# Patient Record
(demographics unavailable — no encounter records)

---

## 2024-10-24 NOTE — HISTORY OF PRESENT ILLNESS
[FreeTextEntry1] : Patient is 52 yo RHD female worked as a  at [employer]. Pt works for [employer], based in [place]. Most recent visit 12/22/2016.   Past hand history 1.  Excision left wrist dorsal ganglion 12/12/2016. 2.  Intermittent complaints of numbness and tingling.  TODAY: Because patient not seen for 7.5 years, patient classified NEW PATIENT for hand evaluation. The patient reports right wrist pain for 5 months.  Problem #1: Right wrist burning pain.  Radiating proximally and distally; arising from the right wrist first extensor compartment. Patient uses a computer and mouse at work.  Problem #2: Left hand: Patient perceives numbness and tingling in the tips of index and long fingers which is more or less continuous. Symptoms began approximately 5 months ago. Sometimes patient has altered sensation in fingernails. Symptoms can be worse upon awakening or when driving.   Patient denies head or neck complaints or history of cervical radiculopathy.  Patient not aware of any numbness or tingling in other fingers in the left hand and no numbness or tingling in right hand. Patient denies triggering. Patient has no other notable hand or wrist complaints.

## 2024-10-24 NOTE — PHYSICAL EXAM
[de-identified] : Right wrist Swelling first compartment with marked tenderness. Finkelstein test negative. Pain exacerbated by thumb abduction extension with wrist in neutral and with wrist in flexion. No other notable wrist findings. Basal joint manipulation minimal crepitus no pain Right hand No A1 pulley tenderness and no triggering in any finger. Normal joints  Left wrist Good motion. First compartment nontender negative Finkelstein, no swelling Left hand Mild atrophy median innervated thenar muscles No pertinent CMC, MP, PIP, or DIP joint contributory finding. No A1 pulley tenderness and no triggering in any finger.  Neurologic Left hand: Altered sensation in the left index and long fingers at rest. Normal sensation other digits Phalen's test with median nerve compression: Negative  Right hand Normal sensation all fingers Phalen's test with median nerve compression: Negative Radial nerve motor and sensory and ulnar nerve motor and sensory are intact.  Right wrist Swelling over first extensor compartment right wrist. Marked tenderness at first compartment and within 1 cm of first compartment. Although the patient describes pain radiating proximally and distally, there is no tenderness greater than 1 cm away from first compartment. 1-2 compartment intersection: No tenderness or pain.  No crepitus. Wrist motion is full without localizing findings of carpal bones or ligamentous laxity. Right basal joint manipulation: Subtle click with dorsal volar stress, otherwise no pain or tenderness. No crepitus other than the subtle click. No other notable wrist findings.  Right hand:  no A1 pulley tenderness and no triggering in any finger. No pertinent MP, PIP, or DIP joint contributory findings.  Left  wrist Good motion without localizing findings. First compartment nontender.  Negative Finkelstein. No pertinent positive left wrist findings.  Left hand Basal joint manipulation no notable crepitus or pain. No A1 pulley tenderness and no triggering in any finger. No pertinent MP, PIP, or DIP joint contributory findings.  Skin: No cyanosis, clubbing, edema or rashes. Vascular: Radial pulses intact. Lymphatic: No streaking or epitrochlear adenopathy. The patient is awake, alert, and oriented. Affect appropriate. Cooperative.

## 2024-10-24 NOTE — HISTORY OF PRESENT ILLNESS
[FreeTextEntry1] : Patient is 52 yo RHD female worked as a  at [employer]. Pt works for [employer], based in [place]. Most recent visit 12/22/2016.   Past hand history 1.  Excision left wrist dorsal ganglion 12/12/2016. 2.  Intermittent complaints of numbness and tingling.  TODAY: Because patient not seen for 7.5 years, patient classified NEW PATIENT for hand evaluation. The patient reports right wrist pain for 5 months.  Problem #1: Right wrist burning pain.  Radiating proximally and distally; arising from the right wrist first extensor compartment. Patient uses a computer and mouse at work.  Problem #2: Left hand: Patient perceives numbness and tingling in the tips of index and long fingers which is more or less continuous. Symptoms began approximately 5 months ago. Sometimes patient has altered sensation in fingernails. Symptoms can be worse upon awakening or when driving.   Patient denies head or neck complaints or history of cervical radiculopathy.  Patient not aware of any numbness or tingling in other fingers in the left hand and no numbness or tingling in right hand. Patient denies triggering. Patient has no other notable hand or wrist complaints.

## 2024-10-24 NOTE — CONSULT LETTER
[Dear  ___] : Dear  [unfilled], [Consult Letter:] : I had the pleasure of evaluating your patient, [unfilled]. [FreeTextEntry1] : The patient was seen in hand consultation today. A copy of my office note is enclosed for your review with the patient's knowledge and consent.  Sincerely,  Ant Paniagua MD Chief, Hand Surgery Residency  ([phone]) Department of Orthopaedic Surgery  [facility] Professor of Orthopaedic Surgery Eduin BARRAGAN at [facility]

## 2024-10-24 NOTE — PHYSICAL EXAM
[de-identified] : Right wrist Swelling first compartment with marked tenderness. Finkelstein test negative. Pain exacerbated by thumb abduction extension with wrist in neutral and with wrist in flexion. No other notable wrist findings. Basal joint manipulation minimal crepitus no pain Right hand No A1 pulley tenderness and no triggering in any finger. Normal joints  Left wrist Good motion. First compartment nontender negative Finkelstein, no swelling Left hand Mild atrophy median innervated thenar muscles No pertinent CMC, MP, PIP, or DIP joint contributory finding. No A1 pulley tenderness and no triggering in any finger.  Neurologic Left hand: Altered sensation in the left index and long fingers at rest. Normal sensation other digits Phalen's test with median nerve compression: Negative  Right hand Normal sensation all fingers Phalen's test with median nerve compression: Negative Radial nerve motor and sensory and ulnar nerve motor and sensory are intact.  Right wrist Swelling over first extensor compartment right wrist. Marked tenderness at first compartment and within 1 cm of first compartment. Although the patient describes pain radiating proximally and distally, there is no tenderness greater than 1 cm away from first compartment. 1-2 compartment intersection: No tenderness or pain.  No crepitus. Wrist motion is full without localizing findings of carpal bones or ligamentous laxity. Right basal joint manipulation: Subtle click with dorsal volar stress, otherwise no pain or tenderness. No crepitus other than the subtle click. No other notable wrist findings.  Right hand:  no A1 pulley tenderness and no triggering in any finger. No pertinent MP, PIP, or DIP joint contributory findings.  Left  wrist Good motion without localizing findings. First compartment nontender.  Negative Finkelstein. No pertinent positive left wrist findings.  Left hand Basal joint manipulation no notable crepitus or pain. No A1 pulley tenderness and no triggering in any finger. No pertinent MP, PIP, or DIP joint contributory findings.  Skin: No cyanosis, clubbing, edema or rashes. Vascular: Radial pulses intact. Lymphatic: No streaking or epitrochlear adenopathy. The patient is awake, alert, and oriented. Affect appropriate. Cooperative.

## 2024-10-24 NOTE — ASSESSMENT
[FreeTextEntry1] : Patient has right wrist pain 5 months duration most likely de Quervain's tendinitis.  There is marked tenderness of first compartment with swelling and provocative tests such as resisted thumb abduction extension are painful.  There are no other notable wrist findings or basal joint findings that would explain the patient's symptoms.  I reviewed treatment options risks and complication with patient.  Questions answered.  Patient requested and was treated with 2 phase cortisone injection into the left wrist first compartment.  Following this treatment the patient had considerable reduction in pain..  There are no other notable right wrist findings.  Patient has constant numbness in the left index and long fingers approximately 5 months duration. The patient reports exacerbations upon awakening and when driving.  History and physical exam are consistent with left carpal tunnel syndrome.  As treatment, patient should wear wrist splint at night. Wrist support splint provided. In addition to splinting at night diagnostic testing is necessary to confirm carpal tunnel syndrome or rule out other entrapment such as cervical radiculopathy.  I have explained to patient that electrodiagnostic studies indicated and recommended.  Patient agrees.  Is referred for upper extremity EMG/nerve conduction studies.  Patient should return after studies have been completed to review results and to formulate additional treatment plan.  Prognosis uncertain. Patient to return to review electrodiagnostic studies but also if the right wrist pain continues or worsens.  If the right wrist pain subsides no further treatment indicated or needed unless or until symptoms recur.  A lengthy and detailed discussion was held with the patient regarding analysis, treatment, and recommendations. All questions have been answered. At the conclusion the patient expressed acceptance, understanding and agreement with the plan.

## 2024-10-24 NOTE — CONSULT LETTER
[Dear  ___] : Dear  [unfilled], [Consult Letter:] : I had the pleasure of evaluating your patient, [unfilled]. [FreeTextEntry1] : The patient was seen in hand consultation today. A copy of my office note is enclosed for your review with the patient's knowledge and consent.  Sincerely,  Ant Paniagua MD Chief, Hand Surgery Residency  ([phone]) Department of Orthopaedic Surgery  [facility] Professor of Orthopaedic Surgery Eduin BARRAGAN at [facility]

## 2024-11-14 NOTE — PROCEDURE
[de-identified] : PRE-PROCEDURE  * Was H&P completed and in chart at time of procedure ?  YES * Were the indications for the procedure appropriate ?  YES * Were the practitioner's entries in the patient's medical record appropriate ?  YES  PROCEDURE * Did the practitioner maintain proper sterile technique ?  YES * If bleeding was encountered, did the practitioner manage it appropriately?  YES * Were complications, if any, recognized and managed appropriately?  YES * Was the practitioner's use of diagnostic services (e.g., lab, x-ray, MRI, CT) appropriate?  YES  POST-PROCEDURE * Did the pre-procedure diagnosis coincide with the findings of the procedure?  YES * Was the procedure report complete, accurate and timely?  YES

## 2024-11-14 NOTE — PROCEDURE
[de-identified] : PRE-PROCEDURE  * Was H&P completed and in chart at time of procedure ?  YES * Were the indications for the procedure appropriate ?  YES * Were the practitioner's entries in the patient's medical record appropriate ?  YES  PROCEDURE * Did the practitioner maintain proper sterile technique ?  YES * If bleeding was encountered, did the practitioner manage it appropriately?  YES * Were complications, if any, recognized and managed appropriately?  YES * Was the practitioner's use of diagnostic services (e.g., lab, x-ray, MRI, CT) appropriate?  YES  POST-PROCEDURE * Did the pre-procedure diagnosis coincide with the findings of the procedure?  YES * Was the procedure report complete, accurate and timely?  YES

## 2024-11-14 NOTE — ASSESSMENT
[FreeTextEntry1] : NCS B/L UE performed at today's office visit.  EDX findings are significant for (1) mild to moderate, demyelinating, sensorimotor, median mononeuropathy across the right wrist; (2) severe, predominantly axonal, sensorimotor, median mononeuropathy across the left wrist within a background of Gregorio-Glory anastomosis.  There is no electrodiagnostic evidence of a concomitant ulnar mononeuropathy across either elbow or more generalized sensory polyneuropathy affecting the patient's arms.  Full report to follow.

## 2025-01-23 NOTE — PHYSICAL EXAM
[de-identified] : Right wrist minimal swelling first compartment ; no tenderness. Finkelstein test negative. No other notable wrist findings. Basal joint manipulation minimal crepitus no pain Right hand No A1 pulley tenderness and no triggering in any finger. No pertinent MP, PIP, or DIP joint contributory findings.  Left wrist Good motion. First compartment nontender negative Finkelstein, no swelling Left hand Mild atrophy median innervated thenar muscles No pertinent CMC, MP, PIP, or DIP joint contributory finding. No A1 pulley tenderness and no triggering in any finger.  Neurologic Left hand: Altered sensation in the left index and long fingers at rest. Normal sensation other digits Phalen's test with median nerve compression: does not exacerbate symptoms  Right hand Normal sensation all fingers Phalen's test with median nerve compression: Negative Radial nerve motor and sensory and ulnar nerve motor and sensory are intact.  Skin: No cyanosis, clubbing, edema or rashes. Vascular: Radial pulses intact. Lymphatic: No streaking or epitrochlear adenopathy. The patient is awake, alert, and oriented. Affect appropriate. Cooperative.

## 2025-01-23 NOTE — ASSESSMENT
[FreeTextEntry1] : Patient has markedly symptomatic left carpal tunnel syndrome.  There is thenar muscle atrophy and sensory conduction is "NR".  Because of the constant symptoms with exacerbation left carpal tunnel release is indicated. Symptoms on right are milder but patient has definite right carpal tunnel syndrome.  At the time of surgery I recommend right carpal tunnel cortisone injection. Patient lives alone.  I discussed with her postop activities and we reviewed ADLs.  Patient has a small dog.  We discussed and reviewed self-care, care of the dog, food prep and many other factors.  Patient believes that her aunt who lives in [place] may move in with her.  Patient would like to postpone surgery until her aunt relocates, if possible. Because patient has constant paresthesias and frequent exacerbations on the left, there is a high probability that the patient will have some ongoing symptoms postoperatively.  There is also a possibility that symptoms will never fully resolved and the patient will always have some degree of numbness, tingling indefinitely.  Exacerbations such as night pain and interruption with sleep is likely to be improved and possibly may subside. Right carpal tunnel syndrome symptoms are much milder.  Cortisone injection recommended. Patient accepts recommendations of left carpal tunnel release and right carpal tunnel cortisone injection.  Patient acknowledges uncertainty of outcome.  Surgery, carpal tunnel release, for left carpal tunnel syndrome is indicated because of symptoms refractory to conservative treatment, interfering with sleep, and activities of daily living.  As above, right carpal tunnel cortisone injection is indicated because of milder right carpal tunnel syndrome symptoms which is planned to be done on the day of surgery. Because of the duration and severity of carpal tunnel syndrome, ongoing symptoms can be expected postoperatively. While the patient may see improvement, there is no assurance of this. The possibility of little improvement or of no improvement exists. Even though it is low, the possibility of worsening exists as well. Risk of injury to the median nerve, CRPS, persistent paresthesias, wound related pain, weakness, and many other factors, reviewed and discussed. Lengthy and detailed discussions have been held with the patient. The surgical plan, alternative treatments, and the associated risks, complications, limitations, and expectations have been discussed with the patient. Postoperative plan, need for exercising to regain motion and function, wound care, dressing care, activities, follow up, and possible need for hand therapy have been reviewed and discussed. In addition, the expectation of postop wound related pain, wound induration, swelling, weakness of , alteration of hand and finger use and function, and palmar and forearm tenderness were reviewed. In particular, the expectation of weakness, difficulty with daily activities, and wound induration often lasting six months have been stressed.  All questions have been answered. The patient has expressed understanding and acceptance of the above and has consented to surgery.

## 2025-01-23 NOTE — PATIENT INSTRUCTIONS
[FreeTextEntry1] : HAND SURGERY PATIENTS  Instructions: Trigger finger, CTR, de Quervain's, etc.      1. Maintain hand elevation for 24 to 48 hours. Try to keep your hand higher than your elbow, relative to the floor.  Elevation is one of the best ways to control pain. Swelling usually peaks during this period. After 48 hours, you do not need to maintain elevation if there is no "throbbing" when your hand is lowered. NOTE: Your hand does not have to be elevated continuously.    2. Bathing: Keep your dressing dry. You may wash exposed fingers with a washcloth. You may shower or bathe with a plastic bag protecting the dressing/cast. Once you've changed the dressing, you should wash or bathe with a disposable glove over the wound. The hand will get wet beneath the glove.  When you remove the glove, use a hand towel or paper towel to dry your hand.   3. Bandage: Start changing your bandage two days after surgery (unless told not to). Apply a 3" x 3" or 2" x 2" gauze pad secured with 2-inch generic Coban or 2" Ace bandage. Try to avoid tape, because it leaves adhesive residue on the skin. You can remove the bandage and use your hand for Activities of Daily Living (ADL) with no dressing, if there is no bleeding, when you are inside the house. Once you change the bandage, you MUST reapply a new clean dressing at least once EVERY day.   4. Exercise: It is important to move your fingers, shoulder, and elbow to prevent stiffness. Fully opening and closing your fingers for 5-10 repetitions every hour or two to maintain full range of motion is essential. When you can easily open and close your fingers fully without pain, you should still exercise 3 to 4 times each day, even when movement becomes easy and painless to prevent finger stiffness. You may perform simple activities under 2 to 3 pounds, e.g., holding a phone, writing, simple keyboarding, using eating utensils. You may drive if you feel fully competent and safe controlling the vehicle.   5. Pain: Numbness from the nerve block (local anesthesia) usually lasts 4-8 hours, but sometimes lasts as long as 24-48 hours. For pain management, I recommend Tylenol, Advil 2 or 3 tablets or Aleve 1 or 2 tablets with food.  In general, we try to avoid narcotic/opioid medication. Remember, elevation is one of the best ways to control pain. Pain is normal during and following exercise periods. Note: You must not take more than 4000mg of Tylenol in 24 hours.   6. Bleeding: Blood staining on your dressing is very common, as is the appearance of "black and blue" on exposed fingers or arm. Black and blue discoloration is expected in and around the surgical area.   7. Swelling: Some finger swelling usually occurs. Exercises and elevation will help control swelling. Ask if you may loosen the dressing. It is important to verify that you may do this.   8. Infection: Redness in the skin around the stitches occurs in approximately 1 and 3 patients.  You should apply triple antibiotic ointment (purchase over-the-counter at a pharmacy) once or twice per day to the area of red skin.  If the skin does not become, red you should not apply antibiotic ointment.    Post-operative infections are rare. If you get PROGRESSIVE redness, swelling, and pain for more than 24 hours that does not respond to elevation and rest, or if you start to run a fever, call the office: [phone].   9. Call to schedule, or confirm, your follow up appointment, even if you read this prior to surgery. Arrange follow up approximately 7-13 days after surgery, or at the time recommended by your surgeon.    Thank you.   Ant Paniagua MD

## 2025-01-23 NOTE — HISTORY OF PRESENT ILLNESS
[FreeTextEntry1] : Patient is 52 yo RHD female employed by [employer], based in [place]. Most recent visit 10/20/2024.  Past hand history 1. Excision left wrist dorsal ganglion 12/12/2016. 2. Intermittent complaints of numbness and tingling. 3.  De Quervain's tendinitis, right, injection 10/20/2024. 4.  Left CTS.  EMG/NCS 11/14/2024: "Severe" left CTS, Gregorio-Glory anastomosis. Left motor latency 4.8 ms, normal less than 4.5 ms.  Amplitude 1.1 mV (normal greater than 4.1) Left sensory conduction "NR". 5.  Right CTS.  EMG 11/14/2024: "Mild to moderate" CTS. Right m sensory latency 4.1 ms (normal less than 3.3), peak latency 5.6 ms (normal less than 4) normal amplitude.  TODAY: Patient presents as a RETURN patient for hand evaluation. The patient returns to review study and for follow-up. 1.  Right wrist first compartment still slight swelling but no tenderness. 2.  Patient reports that numbness and tingling right hand is primarily at night and not notably during the day using a computer, driving or other activities 3.  Patient has constant numbness in the left hand and fingertips Patient states that she has exacerbations of discomfort and fatigue when using her left hand for various activities including keyboarding, shoveling snow, and other ADLs.

## 2025-01-23 NOTE — PHYSICAL EXAM
[de-identified] : Right wrist minimal swelling first compartment ; no tenderness. Finkelstein test negative. No other notable wrist findings. Basal joint manipulation minimal crepitus no pain Right hand No A1 pulley tenderness and no triggering in any finger. No pertinent MP, PIP, or DIP joint contributory findings.  Left wrist Good motion. First compartment nontender negative Finkelstein, no swelling Left hand Mild atrophy median innervated thenar muscles No pertinent CMC, MP, PIP, or DIP joint contributory finding. No A1 pulley tenderness and no triggering in any finger.  Neurologic Left hand: Altered sensation in the left index and long fingers at rest. Normal sensation other digits Phalen's test with median nerve compression: does not exacerbate symptoms  Right hand Normal sensation all fingers Phalen's test with median nerve compression: Negative Radial nerve motor and sensory and ulnar nerve motor and sensory are intact.  Skin: No cyanosis, clubbing, edema or rashes. Vascular: Radial pulses intact. Lymphatic: No streaking or epitrochlear adenopathy. The patient is awake, alert, and oriented. Affect appropriate. Cooperative.

## 2025-01-23 NOTE — HISTORY OF PRESENT ILLNESS
[FreeTextEntry1] : Patient is 54 yo RHD female employed by [employer], based in [place]. Most recent visit 10/20/2024.  Past hand history 1. Excision left wrist dorsal ganglion 12/12/2016. 2. Intermittent complaints of numbness and tingling. 3.  De Quervain's tendinitis, right, injection 10/20/2024. 4.  Left CTS.  EMG/NCS 11/14/2024: "Severe" left CTS, Gregorio-Glory anastomosis. Left motor latency 4.8 ms, normal less than 4.5 ms.  Amplitude 1.1 mV (normal greater than 4.1) Left sensory conduction "NR". 5.  Right CTS.  EMG 11/14/2024: "Mild to moderate" CTS. Right m sensory latency 4.1 ms (normal less than 3.3), peak latency 5.6 ms (normal less than 4) normal amplitude.  TODAY: Patient presents as a RETURN patient for hand evaluation. The patient returns to review study and for follow-up. 1.  Right wrist first compartment still slight swelling but no tenderness. 2.  Patient reports that numbness and tingling right hand is primarily at night and not notably during the day using a computer, driving or other activities 3.  Patient has constant numbness in the left hand and fingertips Patient states that she has exacerbations of discomfort and fatigue when using her left hand for various activities including keyboarding, shoveling snow, and other ADLs.

## 2025-01-23 NOTE — PATIENT INSTRUCTIONS
[FreeTextEntry1] : HAND SURGERY PATIENTS  Instructions: Trigger finger, CTR, de Quervain's, etc.      1. Maintain hand elevation for 24 to 48 hours. Try to keep your hand higher than your elbow, relative to the floor.  Elevation is one of the best ways to control pain. Swelling usually peaks during this period. After 48 hours, you do not need to maintain elevation if there is no "throbbing" when your hand is lowered. NOTE: Your hand does not have to be elevated continuously.    2. Bathing: Keep your dressing dry. You may wash exposed fingers with a washcloth. You may shower or bathe with a plastic bag protecting the dressing/cast. Once you've changed the dressing, you should wash or bathe with a disposable glove over the wound. The hand will get wet beneath the glove.  When you remove the glove, use a hand towel or paper towel to dry your hand.   3. Bandage: Start changing your bandage two days after surgery (unless told not to). Apply a 3" x 3" or 2" x 2" gauze pad secured with 2-inch generic Coban or 2" Ace bandage. Try to avoid tape, because it leaves adhesive residue on the skin. You can remove the bandage and use your hand for Activities of Daily Living (ADL) with no dressing, if there is no bleeding, when you are inside the house. Once you change the bandage, you MUST reapply a new clean dressing at least once EVERY day.   4. Exercise: It is important to move your fingers, shoulder, and elbow to prevent stiffness. Fully opening and closing your fingers for 5-10 repetitions every hour or two to maintain full range of motion is essential. When you can easily open and close your fingers fully without pain, you should still exercise 3 to 4 times each day, even when movement becomes easy and painless to prevent finger stiffness. You may perform simple activities under 2 to 3 pounds, e.g., holding a phone, writing, simple keyboarding, using eating utensils. You may drive if you feel fully competent and safe controlling the vehicle.   5. Pain: Numbness from the nerve block (local anesthesia) usually lasts 4-8 hours, but sometimes lasts as long as 24-48 hours. For pain management, I recommend Tylenol, Advil 2 or 3 tablets or Aleve 1 or 2 tablets with food.  In general, we try to avoid narcotic/opioid medication. Remember, elevation is one of the best ways to control pain. Pain is normal during and following exercise periods. Note: You must not take more than 4000mg of Tylenol in 24 hours.   6. Bleeding: Blood staining on your dressing is very common, as is the appearance of "black and blue" on exposed fingers or arm. Black and blue discoloration is expected in and around the surgical area.   7. Swelling: Some finger swelling usually occurs. Exercises and elevation will help control swelling. Ask if you may loosen the dressing. It is important to verify that you may do this.   8. Infection: Redness in the skin around the stitches occurs in approximately 1 and 3 patients.  You should apply triple antibiotic ointment (purchase over-the-counter at a pharmacy) once or twice per day to the area of red skin.  If the skin does not become, red you should not apply antibiotic ointment.    Post-operative infections are rare. If you get PROGRESSIVE redness, swelling, and pain for more than 24 hours that does not respond to elevation and rest, or if you start to run a fever, call the office: [phone].   9. Call to schedule, or confirm, your follow up appointment, even if you read this prior to surgery. Arrange follow up approximately 7-13 days after surgery, or at the time recommended by your surgeon.    Thank you.   Ant Paniagua MD

## 2025-04-10 NOTE — PHYSICAL EXAM
[de-identified] : Left wrist: Carpal tunnel incision wound is healed, clean and dry. No redness or sign of infection. Sutures removed. Slight redness at the radial and ulnar edges where suture knots eroded into skin. Subjective sensation normal thumb and index and normal in the middle and ring fingers to DIP crease. Diminished sensation distal to the DIP flexion crease and middle and radial aspect of ring Full motion Forearm ecchymosis  Right hand Normal sensation all fingers at rest Patient has no tenderness over right wrist first compartment. Swelling persist following injection.

## 2025-04-10 NOTE — HISTORY OF PRESENT ILLNESS
[FreeTextEntry1] : Patient is 52 yo RHD female employed by [employer], based in [place], underwent left carpal tunnel release and right carpal tunnel cortisone injection 3/28/2025. Patient also had flare of right wrist de Quervain's tendinitis and was treated with cortisone injection intraoperatively  Past hand history 1. Excision left wrist dorsal ganglion 12/12/2016. 2. Intermittent complaints of numbness and tingling. 3. De Quervain's tendinitis, right, injection 10/20/2024. 4. Left CTS. EMG/NCS 11/14/2024: "Severe" left CTS, Gregorio-Glory anastomosis. Left motor latency 4.8 ms, normal less than 4.5 ms. Amplitude 1.1 mV (normal greater than 4.1) Left sensory conduction "NR". 5. Right CTS. EMG 11/14/2024: "Mild to moderate" CTS. Right m sensory latency 4.1 ms (normal less than 3.3), peak latency 5.6 ms (normal less than 4) normal amplitude.  TODAY: Patient presents for 1st postoperative visit following left CTR and right carpal tunnel cortisone injection 3/28/2025. Patient reports that the nighttime symptoms and the tingling have largely subsided. Patient still perceives numbness in the left hand middle finger distal segment and radial aspect ring finger Patient has subjectively normal feeling in thumb and index fingers. Patient reports subjective normal feeling proximal to DIP flexion crease in long and ring fingers. Right hand feels improved following the injection, more or less all better.  Right wrist treated with cortisone injection feels much improved.

## 2025-04-10 NOTE — ASSESSMENT
[FreeTextEntry1] : Benign appearing nevi Lentigines Cherry angiomas - benign, reassurance, no intervention needed unless irritated   - TBSE performed today - no concerning findings - TBSE every year with dermatologist - Photoprotection reviewed including sun-protective behaviors, protective clothing, and the use of OTC broad-spectrum SPF 30+ sunscreens was advised - RTC if develops lesions that are new, symptomatic (bleeding/itching), changing in size/color/shape particularly in areas of prior sun exposure (face, hands, chest)  Rosacea - pt prefers refill of erythromycin -  other tx options (ivermectin, metronidazole) if not effective - pt defers for now

## 2025-04-10 NOTE — HISTORY OF PRESENT ILLNESS
[FreeTextEntry1] : Patient is 54 yo RHD female employed by [employer], based in [place], underwent left carpal tunnel release and right carpal tunnel cortisone injection 3/28/2025. Patient also had flare of right wrist de Quervain's tendinitis and was treated with cortisone injection intraoperatively  Past hand history 1. Excision left wrist dorsal ganglion 12/12/2016. 2. Intermittent complaints of numbness and tingling. 3. De Quervain's tendinitis, right, injection 10/20/2024. 4. Left CTS. EMG/NCS 11/14/2024: "Severe" left CTS, Gregorio-Glory anastomosis. Left motor latency 4.8 ms, normal less than 4.5 ms. Amplitude 1.1 mV (normal greater than 4.1) Left sensory conduction "NR". 5. Right CTS. EMG 11/14/2024: "Mild to moderate" CTS. Right m sensory latency 4.1 ms (normal less than 3.3), peak latency 5.6 ms (normal less than 4) normal amplitude.  TODAY: Patient presents for 1st postoperative visit following left CTR and right carpal tunnel cortisone injection 3/28/2025. Patient reports that the nighttime symptoms and the tingling have largely subsided. Patient still perceives numbness in the left hand middle finger distal segment and radial aspect ring finger Patient has subjectively normal feeling in thumb and index fingers. Patient reports subjective normal feeling proximal to DIP flexion crease in long and ring fingers. Right hand feels improved following the injection, more or less all better.  Right wrist treated with cortisone injection feels much improved.

## 2025-04-10 NOTE — HISTORY OF PRESENT ILLNESS
[FreeTextEntry1] : followup [de-identified] : here for evaluation of skin lesions had bad sunburn when went to [place] no new meds  would like refill of her erythromycin topically - uses for papules of rosacea

## 2025-04-10 NOTE — PHYSICAL EXAM
[FreeTextEntry3] : AAOx3, NAD, well-appearing / pleasant Total body skin exam performed with the exception of:  limited genital/groin evaluation, limited scalp evaluation All examined areas within normal limits with the exception of  desquamation on bilateral arms and nose erythema on nose tan-light brown macules on face, trunk, extremities brown macules and papules on trunk and extremities with no concerning features on dermoscopy red papules on trunk and extremities

## 2025-04-10 NOTE — PHYSICAL EXAM
[de-identified] : Left wrist: Carpal tunnel incision wound is healed, clean and dry. No redness or sign of infection. Sutures removed. Slight redness at the radial and ulnar edges where suture knots eroded into skin. Subjective sensation normal thumb and index and normal in the middle and ring fingers to DIP crease. Diminished sensation distal to the DIP flexion crease and middle and radial aspect of ring Full motion Forearm ecchymosis  Right hand Normal sensation all fingers at rest Patient has no tenderness over right wrist first compartment. Swelling persist following injection.

## 2025-04-10 NOTE — PHYSICAL EXAM
[de-identified] : Left wrist: Carpal tunnel incision wound is healed, clean and dry. No redness or sign of infection. Sutures removed. Slight redness at the radial and ulnar edges where suture knots eroded into skin. Subjective sensation normal thumb and index and normal in the middle and ring fingers to DIP crease. Diminished sensation distal to the DIP flexion crease and middle and radial aspect of ring Full motion Forearm ecchymosis  Right hand Normal sensation all fingers at rest Patient has no tenderness over right wrist first compartment. Swelling persist following injection.

## 2025-04-10 NOTE — HISTORY OF PRESENT ILLNESS
[FreeTextEntry1] : Patient is 52 yo RHD female employed by [employer], based in [place], underwent left carpal tunnel release and right carpal tunnel cortisone injection 3/28/2025. Patient also had flare of right wrist de Quervain's tendinitis and was treated with cortisone injection intraoperatively  Past hand history 1. Excision left wrist dorsal ganglion 12/12/2016. 2. Intermittent complaints of numbness and tingling. 3. De Quervain's tendinitis, right, injection 10/20/2024. 4. Left CTS. EMG/NCS 11/14/2024: "Severe" left CTS, Gregorio-Glory anastomosis. Left motor latency 4.8 ms, normal less than 4.5 ms. Amplitude 1.1 mV (normal greater than 4.1) Left sensory conduction "NR". 5. Right CTS. EMG 11/14/2024: "Mild to moderate" CTS. Right m sensory latency 4.1 ms (normal less than 3.3), peak latency 5.6 ms (normal less than 4) normal amplitude.  TODAY: Patient presents for 1st postoperative visit following left CTR and right carpal tunnel cortisone injection 3/28/2025. Patient reports that the nighttime symptoms and the tingling have largely subsided. Patient still perceives numbness in the left hand middle finger distal segment and radial aspect ring finger Patient has subjectively normal feeling in thumb and index fingers. Patient reports subjective normal feeling proximal to DIP flexion crease in long and ring fingers. Right hand feels improved following the injection, more or less all better.  Right wrist treated with cortisone injection feels much improved.

## 2025-04-10 NOTE — ASSESSMENT
[FreeTextEntry1] : Patient is much improved on the left in terms of nocturnal exacerbations and tingling but still has numbness in the distal portion of middle and ring fingers.  Patient has improvement of right wrist de Quervain's tendinitis which is primary problem on the right. Right carpal tunnel syndrome had been milder from the start and is also improved following the injection.

## 2025-07-06 NOTE — PATIENT INSTRUCTIONS
[FreeTextEntry1] : HAND SURGERY PATIENTS  Instructions: Trigger finger, CTR, de Quervain's, etc.      1. Maintain hand elevation for 24 to 48 hours. Try to keep your hand higher than your elbow, relative to the floor.  Elevation is one of the best ways to control pain. Swelling usually peaks during this period. After 48 hours, you do not need to maintain elevation if there is no "throbbing" when your hand is lowered. NOTE: Your hand does not have to be elevated continuously.    2. Bathing: Keep your dressing dry. You may wash exposed fingers with a washcloth. You may shower or bathe with a plastic bag protecting the dressing/cast. Once you've changed the dressing, you should wash or bathe with a disposable glove over the wound. The hand will get wet beneath the glove.  When you remove the glove, use a hand towel or paper towel to dry your hand.   3. Bandage: Start changing your bandage two days after surgery (unless told not to). Apply a 3" x 3" or 2" x 2" gauze pad secured with 2-inch generic Coban or 2" Ace bandage. Try to avoid tape, because it leaves adhesive residue on the skin. You can remove the bandage and use your hand for Activities of Daily Living (ADL) with no dressing, if there is no bleeding, when you are inside the house. Once you change the bandage, you MUST reapply a new clean dressing at least once EVERY day.   4. Exercise: It is important to move your fingers, shoulder, and elbow to prevent stiffness. Fully opening and closing your fingers for 5-10 repetitions every hour or two to maintain full range of motion is essential. When you can easily open and close your fingers fully without pain, you should still exercise 3 to 4 times each day, even when movement becomes easy and painless to prevent finger stiffness. You may perform simple activities under 2 to 3 pounds, e.g., holding a phone, writing, simple keyboarding, using eating utensils. You may drive if you feel fully competent and safe controlling the vehicle.   5. Pain: Numbness from the nerve block (local anesthesia) usually lasts 4-8 hours, but sometimes lasts as long as 24-48 hours. For pain management, I recommend Tylenol, Advil 2 or 3 tablets or Aleve 1 or 2 tablets with food.  In general, we try to avoid narcotic/opioid medication. Remember, elevation is one of the best ways to control pain. Pain is normal during and following exercise periods. Note: You must not take more than 4000mg of Tylenol in 24 hours.   6. Bleeding: Blood staining on your dressing is very common, as is the appearance of "black and blue" on exposed fingers or arm. Black and blue discoloration is expected in and around the surgical area.   7. Swelling: Some finger swelling usually occurs. Exercises and elevation will help control swelling. Ask if you may loosen the dressing. It is important to verify that you may do this.   8. Infection: Redness in the skin around the stitches occurs in approximately 1 and 3 patients.  You should apply triple antibiotic ointment (purchase over-the-counter at a pharmacy) once or twice per day to the area of red skin.  If the skin does not become, red you should not apply antibiotic ointment.    Post-operative infections are rare. If you get PROGRESSIVE redness, swelling, and pain for more than 24 hours that does not respond to elevation and rest, or if you start to run a fever, call the office: [phone].   9. Call to schedule, or confirm, your follow up appointment, even if you read this prior to surgery. Arrange follow up approximately 7-13 days after surgery, or at the time recommended by your surgeon.    Thank you.   Ant Paniagua MD

## 2025-07-06 NOTE — PATIENT INSTRUCTIONS
[FreeTextEntry1] : HAND SURGERY PATIENTS  Instructions: Trigger finger, CTR, de Quervain's, etc.      1. Maintain hand elevation for 24 to 48 hours. Try to keep your hand higher than your elbow, relative to the floor.  Elevation is one of the best ways to control pain. Swelling usually peaks during this period. After 48 hours, you do not need to maintain elevation if there is no "throbbing" when your hand is lowered. NOTE: Your hand does not have to be elevated continuously.    2. Bathing: Keep your dressing dry. You may wash exposed fingers with a washcloth. You may shower or bathe with a plastic bag protecting the dressing/cast. Once you've changed the dressing, you should wash or bathe with a disposable glove over the wound. The hand will get wet beneath the glove.  When you remove the glove, use a hand towel or paper towel to dry your hand.   3. Bandage: Start changing your bandage two days after surgery (unless told not to). Apply a 3" x 3" or 2" x 2" gauze pad secured with 2-inch generic Coban or 2" Ace bandage. Try to avoid tape, because it leaves adhesive residue on the skin. You can remove the bandage and use your hand for Activities of Daily Living (ADL) with no dressing, if there is no bleeding, when you are inside the house. Once you change the bandage, you MUST reapply a new clean dressing at least once EVERY day.   4. Exercise: It is important to move your fingers, shoulder, and elbow to prevent stiffness. Fully opening and closing your fingers for 5-10 repetitions every hour or two to maintain full range of motion is essential. When you can easily open and close your fingers fully without pain, you should still exercise 3 to 4 times each day, even when movement becomes easy and painless to prevent finger stiffness. You may perform simple activities under 2 to 3 pounds, e.g., holding a phone, writing, simple keyboarding, using eating utensils. You may drive if you feel fully competent and safe controlling the vehicle.   5. Pain: Numbness from the nerve block (local anesthesia) usually lasts 4-8 hours, but sometimes lasts as long as 24-48 hours. For pain management, I recommend Tylenol, Advil 2 or 3 tablets or Aleve 1 or 2 tablets with food.  In general, we try to avoid narcotic/opioid medication. Remember, elevation is one of the best ways to control pain. Pain is normal during and following exercise periods. Note: You must not take more than 4000mg of Tylenol in 24 hours.   6. Bleeding: Blood staining on your dressing is very common, as is the appearance of "black and blue" on exposed fingers or arm. Black and blue discoloration is expected in and around the surgical area.   7. Swelling: Some finger swelling usually occurs. Exercises and elevation will help control swelling. Ask if you may loosen the dressing. It is important to verify that you may do this.   8. Infection: Redness in the skin around the stitches occurs in approximately 1 and 3 patients.  You should apply triple antibiotic ointment (purchase over-the-counter at a pharmacy) once or twice per day to the area of red skin.  If the skin does not become, red you should not apply antibiotic ointment.    Post-operative infections are rare. If you get PROGRESSIVE redness, swelling, and pain for more than 24 hours that does not respond to elevation and rest, or if you start to run a fever, call the office: [phone].   9. Call to schedule, or confirm, your follow up appointment, even if you read this prior to surgery. Arrange follow up approximately 7-13 days after surgery, or at the time recommended by your surgeon.    Thank you.   Ant Paniagua MD

## 2025-07-06 NOTE — ASSESSMENT
[FreeTextEntry1] : Patient would like to avoid surgery but the symptoms on the right have now recurred and are progressive.  Patient is having symptoms at night, and intermittently during the day.  Patient describes symptoms as worse than the left hand was preop.  The left hand is steadily improving.  Palmar pain is much less than it had been, and patient only has slight altered sensation in left middle fingertip and subjectively normal light touch in other digits.  Because of recurrence and progression of right carpal tunnel syndrome symptoms after carpal tunnel cortisone injection, and because symptoms interfere with sleep and ADLs, right carpal tunnel release is indicated, recommended, and accepted by patient.  Surgery, carpal tunnel release, for right carpal tunnel syndrome is indicated because of symptoms refractory to conservative treatment, interfering with sleep, and activities of daily living. Because of the duration and severity of carpal tunnel syndrome, ongoing symptoms can be expected postoperatively. While the patient may see improvement, there is no assurance of this. The possibility of little improvement or of no improvement exists. Even though it is low, the possibility of worsening exists as well. Risk of injury to the median nerve, CRPS, persistent paresthesias, wound related pain, weakness, and many other factors, reviewed and discussed. A lengthy and detailed discussion has been held with the patient. The surgical plan, alternative treatments, and the associated risks, complications, limitations, and expectations have been discussed with the patient. Postoperative plan, need for exercising to regain motion and function, wound care, dressing care, activities, follow up, and possible need for hand therapy have been reviewed and discussed. In addition, the expectation of postop wound related pain, wound induration, swelling, weakness of , alteration of hand and finger use and function, and palmar and forearm tenderness were reviewed. In particular, the expectation of weakness, difficulty with daily activities, and wound induration often lasting six months have been stressed.  All questions have been answered. The patient has expressed understanding and acceptance of the above and has consented to surgery.

## 2025-07-06 NOTE — PATIENT INSTRUCTIONS
[FreeTextEntry1] : HAND SURGERY PATIENTS  Instructions: Trigger finger, CTR, de Quervain's, etc.      1. Maintain hand elevation for 24 to 48 hours. Try to keep your hand higher than your elbow, relative to the floor.  Elevation is one of the best ways to control pain. Swelling usually peaks during this period. After 48 hours, you do not need to maintain elevation if there is no "throbbing" when your hand is lowered. NOTE: Your hand does not have to be elevated continuously.    2. Bathing: Keep your dressing dry. You may wash exposed fingers with a washcloth. You may shower or bathe with a plastic bag protecting the dressing/cast. Once you've changed the dressing, you should wash or bathe with a disposable glove over the wound. The hand will get wet beneath the glove.  When you remove the glove, use a hand towel or paper towel to dry your hand.   3. Bandage: Start changing your bandage two days after surgery (unless told not to). Apply a 3" x 3" or 2" x 2" gauze pad secured with 2-inch generic Coban or 2" Ace bandage. Try to avoid tape, because it leaves adhesive residue on the skin. You can remove the bandage and use your hand for Activities of Daily Living (ADL) with no dressing, if there is no bleeding, when you are inside the house. Once you change the bandage, you MUST reapply a new clean dressing at least once EVERY day.   4. Exercise: It is important to move your fingers, shoulder, and elbow to prevent stiffness. Fully opening and closing your fingers for 5-10 repetitions every hour or two to maintain full range of motion is essential. When you can easily open and close your fingers fully without pain, you should still exercise 3 to 4 times each day, even when movement becomes easy and painless to prevent finger stiffness. You may perform simple activities under 2 to 3 pounds, e.g., holding a phone, writing, simple keyboarding, using eating utensils. You may drive if you feel fully competent and safe controlling the vehicle.   5. Pain: Numbness from the nerve block (local anesthesia) usually lasts 4-8 hours, but sometimes lasts as long as 24-48 hours. For pain management, I recommend Tylenol, Advil 2 or 3 tablets or Aleve 1 or 2 tablets with food.  In general, we try to avoid narcotic/opioid medication. Remember, elevation is one of the best ways to control pain. Pain is normal during and following exercise periods. Note: You must not take more than 4000mg of Tylenol in 24 hours.   6. Bleeding: Blood staining on your dressing is very common, as is the appearance of "black and blue" on exposed fingers or arm. Black and blue discoloration is expected in and around the surgical area.   7. Swelling: Some finger swelling usually occurs. Exercises and elevation will help control swelling. Ask if you may loosen the dressing. It is important to verify that you may do this.   8. Infection: Redness in the skin around the stitches occurs in approximately 1 and 3 patients.  You should apply triple antibiotic ointment (purchase over-the-counter at a pharmacy) once or twice per day to the area of red skin.  If the skin does not become, red you should not apply antibiotic ointment.    Post-operative infections are rare. If you get PROGRESSIVE redness, swelling, and pain for more than 24 hours that does not respond to elevation and rest, or if you start to run a fever, call the office: [phone].   9. Call to schedule, or confirm, your follow up appointment, even if you read this prior to surgery. Arrange follow up approximately 7-13 days after surgery, or at the time recommended by your surgeon.    Thank you.   Ant Paniagua MD

## 2025-07-06 NOTE — PHYSICAL EXAM
[de-identified] : Left wrist: Carpal tunnel incision wound is healed,  Induration is subsiding.  Tenderness is mild. No A1 pulley tenderness and no triggering in any finger. No pertinent MP, PIP, or DIP joint contributory findings, except some Heberden's nodes; none are clinically painful. Full active flexion/extension  Right hand No A1 pulley tenderness and no triggering in any finger. No pertinent MP, PIP, or DIP joint contributory findings, except some Heberden's nodes; none are clinically painful.  Neurologic:  Normal sensation intact at rest bilaterally. Phalen's test: negative left. Minimally positive right. Radial nerve motor and sensory and ulnar nerve motor and sensory are intact.  Skin: No cyanosis, clubbing, edema or rashes. Vascular: Radial pulses intact. Lymphatic: No streaking or epitrochlear adenopathy. The patient is awake, alert, and oriented. Affect appropriate. Cooperative.

## 2025-07-06 NOTE — HISTORY OF PRESENT ILLNESS
[FreeTextEntry1] : Patient is an almost 53 yo RHD female employed by [employer], based in [place], underwent Lt CTR and Rt carpal tunnel cortisone injection 3/28/2025. b/o flare of right wrist de Quervain's tendinitis preoperatively: pt given cortisone injection intraop.  Past hand history 1. Excision left wrist dorsal ganglion 12/12/2016. 2. Intermittent complaints of numbness and tingling. 3. De Quervain's tendinitis, right, injection 10/20/2024. 4. Left CTS. EMG/NCS 11/14/2024: "Severe" left CTS, Gregorio-Glory anastomosis. Left motor latency 4.8 ms, normal less than 4.5 ms. Amplitude 1.1 mV (normal greater than 4.1) Left sensory conduction "NR". 5. Right CTS. EMG 11/14/2024: "Mild to moderate" CTS. Right m sensory latency 4.1 ms (normal less than 3.3), peak latency 5.6 ms (normal less than 4) normal amplitude. --- Cortisone injection 3/28/2025 6.  Left CTR 3/28/2025.  Telephone 4/28/2025: Pillar pain.  TODAY: Patient presents as a RETURN patient for hand evaluation. The patient reports that following left carpal tunnel release, numbness is now in the left middle finger distal segment only. Patient reports light touch sensation is now normal in the other fingers of left hand. Pillar pain which was problematic postop has improved greatly.  Today's primary complaint: Patient describes right hand burning, numbness, and pain. Symptoms on the right are now perceived as "worse than I was experiencing in my left hand prior to the surgery". Following the right carpal tunnel cortisone injection March 2025 at the time of left hand surgery, patient reports improvement for 2 months. Then symptoms recurred and have progressively worsened to the degree that they are now worse in the left hand had been preop. Patient denies triggering. Patient has no other notable hand complaints for which she seeks treatment today.

## 2025-07-06 NOTE — HISTORY OF PRESENT ILLNESS
[FreeTextEntry1] : Patient is an almost 55 yo RHD female employed by [employer], based in [place], underwent Lt CTR and Rt carpal tunnel cortisone injection 3/28/2025. b/o flare of right wrist de Quervain's tendinitis preoperatively: pt given cortisone injection intraop.  Past hand history 1. Excision left wrist dorsal ganglion 12/12/2016. 2. Intermittent complaints of numbness and tingling. 3. De Quervain's tendinitis, right, injection 10/20/2024. 4. Left CTS. EMG/NCS 11/14/2024: "Severe" left CTS, Gregorio-Glory anastomosis. Left motor latency 4.8 ms, normal less than 4.5 ms. Amplitude 1.1 mV (normal greater than 4.1) Left sensory conduction "NR". 5. Right CTS. EMG 11/14/2024: "Mild to moderate" CTS. Right m sensory latency 4.1 ms (normal less than 3.3), peak latency 5.6 ms (normal less than 4) normal amplitude. --- Cortisone injection 3/28/2025 6.  Left CTR 3/28/2025.  Telephone 4/28/2025: Pillar pain.  TODAY: Patient presents as a RETURN patient for hand evaluation. The patient reports that following left carpal tunnel release, numbness is now in the left middle finger distal segment only. Patient reports light touch sensation is now normal in the other fingers of left hand. Pillar pain which was problematic postop has improved greatly.  Today's primary complaint: Patient describes right hand burning, numbness, and pain. Symptoms on the right are now perceived as "worse than I was experiencing in my left hand prior to the surgery". Following the right carpal tunnel cortisone injection March 2025 at the time of left hand surgery, patient reports improvement for 2 months. Then symptoms recurred and have progressively worsened to the degree that they are now worse in the left hand had been preop. Patient denies triggering. Patient has no other notable hand complaints for which she seeks treatment today.

## 2025-07-06 NOTE — PHYSICAL EXAM
[de-identified] : Left wrist: Carpal tunnel incision wound is healed,  Induration is subsiding.  Tenderness is mild. No A1 pulley tenderness and no triggering in any finger. No pertinent MP, PIP, or DIP joint contributory findings, except some Heberden's nodes; none are clinically painful. Full active flexion/extension  Right hand No A1 pulley tenderness and no triggering in any finger. No pertinent MP, PIP, or DIP joint contributory findings, except some Heberden's nodes; none are clinically painful.  Neurologic:  Normal sensation intact at rest bilaterally. Phalen's test: negative left. Minimally positive right. Radial nerve motor and sensory and ulnar nerve motor and sensory are intact.  Skin: No cyanosis, clubbing, edema or rashes. Vascular: Radial pulses intact. Lymphatic: No streaking or epitrochlear adenopathy. The patient is awake, alert, and oriented. Affect appropriate. Cooperative.

## 2025-07-06 NOTE — HISTORY OF PRESENT ILLNESS
[FreeTextEntry1] : Patient is an almost 53 yo RHD female employed by [employer], based in [place], underwent Lt CTR and Rt carpal tunnel cortisone injection 3/28/2025. b/o flare of right wrist de Quervain's tendinitis preoperatively: pt given cortisone injection intraop.  Past hand history 1. Excision left wrist dorsal ganglion 12/12/2016. 2. Intermittent complaints of numbness and tingling. 3. De Quervain's tendinitis, right, injection 10/20/2024. 4. Left CTS. EMG/NCS 11/14/2024: "Severe" left CTS, Gregorio-Glory anastomosis. Left motor latency 4.8 ms, normal less than 4.5 ms. Amplitude 1.1 mV (normal greater than 4.1) Left sensory conduction "NR". 5. Right CTS. EMG 11/14/2024: "Mild to moderate" CTS. Right m sensory latency 4.1 ms (normal less than 3.3), peak latency 5.6 ms (normal less than 4) normal amplitude. --- Cortisone injection 3/28/2025 6.  Left CTR 3/28/2025.  Telephone 4/28/2025: Pillar pain.  TODAY: Patient presents as a RETURN patient for hand evaluation. The patient reports that following left carpal tunnel release, numbness is now in the left middle finger distal segment only. Patient reports light touch sensation is now normal in the other fingers of left hand. Pillar pain which was problematic postop has improved greatly.  Today's primary complaint: Patient describes right hand burning, numbness, and pain. Symptoms on the right are now perceived as "worse than I was experiencing in my left hand prior to the surgery". Following the right carpal tunnel cortisone injection March 2025 at the time of left hand surgery, patient reports improvement for 2 months. Then symptoms recurred and have progressively worsened to the degree that they are now worse in the left hand had been preop. Patient denies triggering. Patient has no other notable hand complaints for which she seeks treatment today.

## 2025-07-06 NOTE — PHYSICAL EXAM
[de-identified] : Left wrist: Carpal tunnel incision wound is healed,  Induration is subsiding.  Tenderness is mild. No A1 pulley tenderness and no triggering in any finger. No pertinent MP, PIP, or DIP joint contributory findings, except some Heberden's nodes; none are clinically painful. Full active flexion/extension  Right hand No A1 pulley tenderness and no triggering in any finger. No pertinent MP, PIP, or DIP joint contributory findings, except some Heberden's nodes; none are clinically painful.  Neurologic:  Normal sensation intact at rest bilaterally. Phalen's test: negative left. Minimally positive right. Radial nerve motor and sensory and ulnar nerve motor and sensory are intact.  Skin: No cyanosis, clubbing, edema or rashes. Vascular: Radial pulses intact. Lymphatic: No streaking or epitrochlear adenopathy. The patient is awake, alert, and oriented. Affect appropriate. Cooperative.